# Patient Record
Sex: FEMALE | Race: WHITE | Employment: FULL TIME | URBAN - METROPOLITAN AREA
[De-identification: names, ages, dates, MRNs, and addresses within clinical notes are randomized per-mention and may not be internally consistent; named-entity substitution may affect disease eponyms.]

---

## 2018-10-26 ENCOUNTER — HOSPITAL ENCOUNTER (EMERGENCY)
Age: 43
Discharge: HOME | End: 2018-10-26
Attending: EMERGENCY MEDICINE
Payer: MEDICARE

## 2018-10-26 ENCOUNTER — APPOINTMENT (OUTPATIENT)
Dept: GENERAL RADIOLOGY | Age: 43
End: 2018-10-26
Payer: MEDICARE

## 2018-10-26 VITALS
RESPIRATION RATE: 18 BRPM | DIASTOLIC BLOOD PRESSURE: 106 MMHG | WEIGHT: 250 LBS | BODY MASS INDEX: 40.18 KG/M2 | TEMPERATURE: 97.8 F | OXYGEN SATURATION: 100 % | SYSTOLIC BLOOD PRESSURE: 189 MMHG | HEART RATE: 92 BPM | HEIGHT: 66 IN

## 2018-10-26 DIAGNOSIS — S63.501A RIGHT WRIST SPRAIN, INITIAL ENCOUNTER: Primary | ICD-10-CM

## 2018-10-26 PROCEDURE — 73110 X-RAY EXAM OF WRIST: CPT

## 2018-10-26 PROCEDURE — 99283 EMERGENCY DEPT VISIT LOW MDM: CPT

## 2018-10-26 ASSESSMENT — PAIN DESCRIPTION - LOCATION: LOCATION: WRIST

## 2018-10-26 ASSESSMENT — PAIN DESCRIPTION - PAIN TYPE: TYPE: ACUTE PAIN

## 2018-10-26 ASSESSMENT — PAIN DESCRIPTION - FREQUENCY: FREQUENCY: CONTINUOUS

## 2018-10-26 ASSESSMENT — PAIN DESCRIPTION - DESCRIPTORS: DESCRIPTORS: ACHING

## 2018-10-26 ASSESSMENT — PAIN SCALES - GENERAL: PAINLEVEL_OUTOF10: 7

## 2018-10-26 ASSESSMENT — PAIN DESCRIPTION - ORIENTATION: ORIENTATION: RIGHT

## 2018-10-26 NOTE — ED NOTES
Pt dcd home in stable condition along with her belongings and paperwork, pt verbalizes understanding of dc, follow up, and medication instructions. Denies any questions.      Carlos Phelps RN  10/26/18 5121

## 2020-08-25 ENCOUNTER — APPOINTMENT (OUTPATIENT)
Dept: GENERAL RADIOLOGY | Age: 45
End: 2020-08-25
Payer: MEDICARE

## 2020-08-25 ENCOUNTER — HOSPITAL ENCOUNTER (EMERGENCY)
Age: 45
Discharge: HOME | End: 2020-08-25
Attending: EMERGENCY MEDICINE
Payer: MEDICARE

## 2020-08-25 VITALS
HEART RATE: 76 BPM | OXYGEN SATURATION: 98 % | DIASTOLIC BLOOD PRESSURE: 97 MMHG | WEIGHT: 257 LBS | HEIGHT: 69 IN | BODY MASS INDEX: 38.06 KG/M2 | SYSTOLIC BLOOD PRESSURE: 141 MMHG | RESPIRATION RATE: 16 BRPM | TEMPERATURE: 98.1 F

## 2020-08-25 LAB
ALBUMIN SERPL-MCNC: 4.3 G/DL (ref 3.4–5)
ALP BLD-CCNC: 113 U/L (ref 40–129)
ALT SERPL-CCNC: 39 U/L (ref 10–40)
ANION GAP SERPL CALCULATED.3IONS-SCNC: 12 MMOL/L (ref 3–16)
AST SERPL-CCNC: 31 U/L (ref 15–37)
BASOPHILS ABSOLUTE: 0.1 K/UL (ref 0–0.2)
BASOPHILS RELATIVE PERCENT: 0.9 %
BILIRUB SERPL-MCNC: 0.7 MG/DL (ref 0–1)
BILIRUBIN DIRECT: <0.2 MG/DL (ref 0–0.3)
BILIRUBIN URINE: ABNORMAL
BILIRUBIN, INDIRECT: NORMAL MG/DL (ref 0–1)
BLOOD, URINE: ABNORMAL
BUN BLDV-MCNC: 15 MG/DL (ref 7–20)
CALCIUM SERPL-MCNC: 9.2 MG/DL (ref 8.3–10.6)
CHLORIDE BLD-SCNC: 104 MMOL/L (ref 99–110)
CLARITY: CLEAR
CO2: 23 MMOL/L (ref 21–32)
COLOR: ABNORMAL
COMMENT UA: ABNORMAL
CREAT SERPL-MCNC: 1 MG/DL (ref 0.6–1.1)
EKG ATRIAL RATE: 68 BPM
EKG DIAGNOSIS: NORMAL
EKG P AXIS: 25 DEGREES
EKG P-R INTERVAL: 182 MS
EKG Q-T INTERVAL: 404 MS
EKG QRS DURATION: 84 MS
EKG QTC CALCULATION (BAZETT): 429 MS
EKG R AXIS: 0 DEGREES
EKG T AXIS: 11 DEGREES
EKG VENTRICULAR RATE: 68 BPM
EOSINOPHILS ABSOLUTE: 0.3 K/UL (ref 0–0.6)
EOSINOPHILS RELATIVE PERCENT: 4.2 %
GFR AFRICAN AMERICAN: >60
GFR NON-AFRICAN AMERICAN: >60
GLUCOSE BLD-MCNC: 154 MG/DL (ref 70–99)
GLUCOSE BLD-MCNC: 161 MG/DL (ref 70–99)
GLUCOSE URINE: 100 MG/DL
HCT VFR BLD CALC: 39.3 % (ref 36–48)
HEMOGLOBIN: 13.5 G/DL (ref 12–16)
KETONES, URINE: NEGATIVE MG/DL
LEUKOCYTE ESTERASE, URINE: ABNORMAL
LIPASE: 43 U/L (ref 13–60)
LYMPHOCYTES ABSOLUTE: 2.3 K/UL (ref 1–5.1)
LYMPHOCYTES RELATIVE PERCENT: 33.5 %
MCH RBC QN AUTO: 29.3 PG (ref 26–34)
MCHC RBC AUTO-ENTMCNC: 34.4 G/DL (ref 31–36)
MCV RBC AUTO: 85.1 FL (ref 80–100)
MICROSCOPIC EXAMINATION: YES
MONOCYTES ABSOLUTE: 0.4 K/UL (ref 0–1.3)
MONOCYTES RELATIVE PERCENT: 6.2 %
NEUTROPHILS ABSOLUTE: 3.7 K/UL (ref 1.7–7.7)
NEUTROPHILS RELATIVE PERCENT: 55.2 %
NITRITE, URINE: POSITIVE
PDW BLD-RTO: 15.3 % (ref 12.4–15.4)
PERFORMED ON: ABNORMAL
PH UA: 6.5 (ref 5–8)
PLATELET # BLD: 183 K/UL (ref 135–450)
PMV BLD AUTO: 9.1 FL (ref 5–10.5)
POTASSIUM REFLEX MAGNESIUM: 3.6 MMOL/L (ref 3.5–5.1)
PROTEIN UA: 30 MG/DL
RBC # BLD: 4.62 M/UL (ref 4–5.2)
RBC UA: >100 /HPF (ref 0–4)
SODIUM BLD-SCNC: 139 MMOL/L (ref 136–145)
SPECIFIC GRAVITY UA: 1.02 (ref 1–1.03)
TOTAL PROTEIN: 7 G/DL (ref 6.4–8.2)
URINE TYPE: ABNORMAL
UROBILINOGEN, URINE: 1 E.U./DL
WBC # BLD: 6.7 K/UL (ref 4–11)
WBC UA: ABNORMAL /HPF (ref 0–5)

## 2020-08-25 PROCEDURE — 87186 SC STD MICRODIL/AGAR DIL: CPT

## 2020-08-25 PROCEDURE — 85025 COMPLETE CBC W/AUTO DIFF WBC: CPT

## 2020-08-25 PROCEDURE — 87088 URINE BACTERIA CULTURE: CPT

## 2020-08-25 PROCEDURE — 93005 ELECTROCARDIOGRAM TRACING: CPT | Performed by: STUDENT IN AN ORGANIZED HEALTH CARE EDUCATION/TRAINING PROGRAM

## 2020-08-25 PROCEDURE — 80048 BASIC METABOLIC PNL TOTAL CA: CPT

## 2020-08-25 PROCEDURE — 99284 EMERGENCY DEPT VISIT MOD MDM: CPT

## 2020-08-25 PROCEDURE — 80076 HEPATIC FUNCTION PANEL: CPT

## 2020-08-25 PROCEDURE — 81001 URINALYSIS AUTO W/SCOPE: CPT

## 2020-08-25 PROCEDURE — 83690 ASSAY OF LIPASE: CPT

## 2020-08-25 PROCEDURE — 87086 URINE CULTURE/COLONY COUNT: CPT

## 2020-08-25 RX ORDER — CEPHALEXIN 500 MG/1
500 CAPSULE ORAL 4 TIMES DAILY
Qty: 40 CAPSULE | Refills: 0 | Status: SHIPPED | OUTPATIENT
Start: 2020-08-25 | End: 2020-09-04

## 2020-08-25 RX ORDER — CEPHALEXIN 500 MG/1
500 CAPSULE ORAL EVERY 6 HOURS SCHEDULED
Status: DISCONTINUED | OUTPATIENT
Start: 2020-08-25 | End: 2020-08-25

## 2020-08-25 ASSESSMENT — PAIN DESCRIPTION - ORIENTATION: ORIENTATION: LEFT;LOWER

## 2020-08-25 ASSESSMENT — PAIN DESCRIPTION - PAIN TYPE: TYPE: ACUTE PAIN

## 2020-08-25 ASSESSMENT — PAIN DESCRIPTION - LOCATION: LOCATION: ABDOMEN

## 2020-08-25 ASSESSMENT — PAIN SCALES - GENERAL: PAINLEVEL_OUTOF10: 7

## 2020-08-25 ASSESSMENT — PAIN DESCRIPTION - DESCRIPTORS: DESCRIPTORS: DISCOMFORT;SHARP

## 2020-08-25 ASSESSMENT — PAIN DESCRIPTION - FREQUENCY: FREQUENCY: INTERMITTENT

## 2020-08-25 NOTE — ED PROVIDER NOTES
ED Attending Attestation Note     Date of evaluation: 8/25/2020    This patient was seen by the resident. I have seen and examined the patient, agree with the workup, evaluation, management and diagnosis. The care plan has been discussed. I have reviewed the ECG and concur. My assessment reveals patient with syncopal episode followed by hematuria. Flank pain here but not a story c/w stone but more c/w infection. Will start abx for pyelo.      Lisa Ordaz MD  08/25/20 [id number]

## 2020-08-25 NOTE — ED PROVIDER NOTES
[address] [facility] RESIDENT NOTE       Date of evaluation: 8/25/2020    Chief Complaint     Abdominal Pain (lower abd pain, hematuria x 3-4 days. Pt states she woke up on the living room floor and thought she passed out from low blood sugar. ) and Hematuria      of Present Illness     Luis Greer is a 38 y.o. female with past medical history of polycystic kidney disease who presents with hematuria. She states yesterday she noticed pink-tinged urine and today she had gross bright red urine in the toilet bowl. She has never had this happen before. She denies any dysuria, urinary frequency, urinary urgency. States that she has some suprapubic pain as well that is worse with urination. She also had an episode of syncope today when she was standing there in her living room she noticed whitening of her vision and then passed out. She states she was able to ambulate afterwards and denies any headache or other symptoms at this time. Other than stated above, no additional aggravating or alleviating factors are noted. Review of Systems     Positive for hematuria, abdominal pain, syncope. Negative for headache, dizziness, numbness, lightheadedness, shortness of breath, chest pain, nausea, vomiting, diarrhea. All other systems reviewed and are negative except as mentioned in HPI. Past Medical, Surgical, Family, and Social History     She has a past medical history of Footdrop, Hashimoto's disease, Hypertension, and Polycystic kidney disease. She has a past surgical history that includes back surgery. Her family history is not on file. She reports that she has never smoked. She has never used smokeless tobacco. She reports that she does not drink alcohol or use drugs.     Medications     Previous Medications    LISINOPRIL-HYDROCHLOROTHIAZIDE (PRINZIDE;ZESTORETIC) 20-25 MG PER TABLET    Take 1 tablet by mouth daily    MELOXICAM (MOBIC) 15 MG TABLET    *CALLED WC, WAITING FOR CALL BACK* TAKE 1 TABLET BY MOUTH EVERY DAY    METFORMIN (GLUCOPHAGE) 500 MG TABLET    Take 1,000 mg by mouth 2 times daily (with meals)    PROPRANOLOL (INDERAL) 80 MG TABLET    Take 80 mg by mouth 3 times daily    SEMAGLUTIDE (OZEMPIC, 1 MG/DOSE, SC)    Inject into the skin once a week TUESDAYS       Allergies     She is allergic to sulfa antibiotics. Physical Exam     INITIAL VITALS: BP: (!) 156/108, Temp: 98.1 °F (36.7 °C), Pulse: 71, Resp: 18, SpO2: 98 %   General:  Well appearing. No acute distress, appears mildly dehydrated  Eyes:  PERRL. No discharge from eyes   ENT:  No discharge from nose. OP clear  Neck:  Supple, trachea midline  Pulmonary:   Non-labored breathing. Breath sounds clear bilaterally  Cardiac:  Regular rate and rhythm. No murmurs  Abdomen:  Soft. Non-distended. Suprapubic pain, R CVA tenderness  Musculoskeletal:  No long bone deformity. Vascular:  Extremities warm and perfused. Normal pulses in all 4 extremities  Skin:  Dry, no rashes  Extremities:  No peripheral edema  Neuro: Alert. Moves all four extremities to command. Sensation grossly intact to light touch. Speech and mentation normal. No focal deficit. Gait narrow and stable.      Diagnostic Results     EKG   Interpreted in conjunction with emergencydepartment physician Tom Gamboa MD  Rhythm: normal sinus   Rate: normal  Axis: normal  Ectopy: none  Conduction: normal  ST Segments: no acute change  T Waves:no acute change  Q Waves: none  Clinical Impression: no acute changes    RADIOLOGY:  No orders to display       LABS:   Results for orders placed or performed during the hospital encounter of 08/25/20   CBC Auto Differential   Result Value Ref Range    WBC 6.7 4.0 - 11.0 K/uL    RBC 4.62 4.00 - 5.20 M/uL    Hemoglobin 13.5 12.0 - 16.0 g/dL    Hematocrit 39.3 36.0 - 48.0 %    MCV 85.1 80.0 - 100.0 fL    MCH 29.3 26.0 - 34.0 pg    MCHC 34.4 31.0 - 36.0 g/dL    RDW 15.3 12.4 - 15.4 %    Platelets 607 552 - 680 K/uL    MPV 9.1 5.0 - 10.5 fL    Neutrophils % 55.2 %    Lymphocytes % 33.5 %    Monocytes % 6.2 %    Eosinophils % 4.2 %    Basophils % 0.9 %    Neutrophils Absolute 3.7 1.7 - 7.7 K/uL    Lymphocytes Absolute 2.3 1.0 - 5.1 K/uL    Monocytes Absolute 0.4 0.0 - 1.3 K/uL    Eosinophils Absolute 0.3 0.0 - 0.6 K/uL    Basophils Absolute 0.1 0.0 - 0.2 K/uL   Basic Metabolic Panel w/ Reflex to MG   Result Value Ref Range    Sodium 139 136 - 145 mmol/L    Potassium reflex Magnesium 3.6 3.5 - 5.1 mmol/L    Chloride 104 99 - 110 mmol/L    CO2 23 21 - 32 mmol/L    Anion Gap 12 3 - 16    Glucose 154 (H) 70 - 99 mg/dL    BUN 15 7 - 20 mg/dL    CREATININE 1.0 0.6 - 1.1 mg/dL    GFR Non-African American >60 >60    GFR African American >60 >60    Calcium 9.2 8.3 - 10.6 mg/dL   Urinalysis, reflex to microscopic   Result Value Ref Range    Color, UA ORANGE (A) Straw/Yellow    Clarity, UA Clear Clear    Glucose, Ur 100 (A) Negative mg/dL    Bilirubin Urine SMALL (A) Negative    Ketones, Urine Negative Negative mg/dL    Specific Gravity, UA 1.025 1.005 - 1.030    Blood, Urine LARGE (A) Negative    pH, UA 6.5 5.0 - 8.0    Protein, UA 30 (A) Negative mg/dL    Urobilinogen, Urine 1.0 <2.0 E.U./dL    Nitrite, Urine POSITIVE (A) Negative    Leukocyte Esterase, Urine MODERATE (A) Negative    Microscopic Examination YES     Urine Type Voided    Hepatic Function Panel   Result Value Ref Range    Total Protein 7.0 6.4 - 8.2 g/dL    Alb 4.3 3.4 - 5.0 g/dL    Alkaline Phosphatase 113 40 - 129 U/L    ALT 39 10 - 40 U/L    AST 31 15 - 37 U/L    Total Bilirubin 0.7 0.0 - 1.0 mg/dL    Bilirubin, Direct <0.2 0.0 - 0.3 mg/dL    Bilirubin, Indirect see below 0.0 - 1.0 mg/dL   Lipase   Result Value Ref Range    Lipase 43.0 13.0 - 60.0 U/L   Microscopic Urinalysis   Result Value Ref Range    WBC, UA 21-50 (A) 0 - 5 /HPF    RBC, UA >100 (A) 0 - 4 /HPF    Urinalysis Comments see below    POCT Glucose   Result Value Ref Range    POC Glucose 161 (H) 70 - 99 mg/dl    Performed on ACCU-CHEK    EKG 12 Lead   Result Value Ref Range    Ventricular Rate 68 BPM    Atrial Rate 68 BPM    P-R Interval 182 ms    QRS Duration 84 ms    Q-T Interval 404 ms    QTc Calculation (Bazett) 429 ms    P Axis 25 degrees    R Axis 0 degrees    T Axis 11 degrees    Diagnosis       EKG performed in ER and to be interpreted by ER physician. Confirmed by MD, ER ([id number]),  Meg Ahumada ([phone]) on 8/25/2020 5:40:38 PM       ED BEDSIDE ULTRASOUND:      RECENT VITALS:  BP: (!) 141/97, Temp: 98.1 °F (36.7 °C), Pulse: 76,Resp: 16, SpO2: 98 %     Procedures         ED Course     Nursing Notes, Past Medical Hx, Past Surgical Hx, Social Hx, Allergies, and Family Hx were reviewed. The patient was given the followingmedications:  Orders Placed This Encounter   Medications    DISCONTD: cephALEXin (KEFLEX) capsule 500 mg    cephALEXin (KEFLEX) 500 MG capsule     Sig: Take 1 capsule by mouth 4 times daily for 10 days     Dispense:  40 capsule     Refill:  0       CONSULTS:  None    MEDICAL DECISION MAKING / ASSESSMENT / Tanya Scotty is a 38 y.o. female with a history and presentation as described above in HPI. The patient was evaluated by myself and the ED Attending Physician, Dr. Rama Portillo. All management and disposition plans were discussed and agreed upon. Upon presentation, the patient was well appearing and had stable vitals. Differential diagnosis include uncomplicated urinary tract infection, nephrolithiasis, pyelonephritis. Syncope differential includes orthostatic, hypovolemic, arrhythmia, ischemia. EKG normal, no evidence of ischemia. CBC with normal hemoglobin, LFTs, lipase unremarkable. Urinalysis with gross evidence of urinary tract infection including positive nitrites, moderate leukocyte esterase, hematuria. Points of care glucose 161. On reassessment, she is able to ambulate comfortably and has no recurrence of syncopal symptoms.   Suspect that this syncopal episode was secondary to pain or orthostasis, as she appears moderately dehydrated. Given she has right CVA tenderness and is more predisposed to pyelonephritis from her polycystic kidney disease, we will treat her for pyelonephritis with extended antibiotics outpatient. Instructed the patient to follow-up closely with her nephrologist if the hematuria does not resolve with treatment of the infection and return to the emergency department with new or worsening symptoms including repeat syncope, worsening of her abdominal pain or flank pain. Discharge: At this time, the patient was deemed appropriate for discharge. Workup, treatment and diagnosis were discussed with the patient and/or family members; the patient agrees to the plan and all questions were addressed and answered. My customary discharge instructions, including strict return precautions for new or worsening symptoms or any concern she believes warrants acute physician evaluation, were provided. she was subsequently sent home in stable/improved condition    Medications received during this ED visit:    Medications - No data to display    Clinical Impression     1. Acute pyelonephritis        Disposition     PATIENT REFERRED TO:  No follow-up provider specified.     DISCHARGE MEDICATIONS:  New Prescriptions    CEPHALEXIN (KEFLEX) 500 MG CAPSULE    Take 1 capsule by mouth 4 times daily for 10 days       DISPOSITION Decision To Discharge 08/25/2020 05:51:11 Mellisa Woods MD  Resident  08/25/20 9461

## 2020-08-27 LAB
ORGANISM: ABNORMAL
URINE CULTURE, ROUTINE: ABNORMAL

## 2021-08-02 ENCOUNTER — APPOINTMENT (OUTPATIENT)
Dept: GENERAL RADIOLOGY | Age: 46
End: 2021-08-02
Payer: COMMERCIAL

## 2021-08-02 ENCOUNTER — HOSPITAL ENCOUNTER (EMERGENCY)
Age: 46
Discharge: HOME | End: 2021-08-02
Attending: STUDENT IN AN ORGANIZED HEALTH CARE EDUCATION/TRAINING PROGRAM
Payer: COMMERCIAL

## 2021-08-02 VITALS
TEMPERATURE: 98.1 F | HEART RATE: 76 BPM | OXYGEN SATURATION: 96 % | DIASTOLIC BLOOD PRESSURE: 95 MMHG | SYSTOLIC BLOOD PRESSURE: 143 MMHG | RESPIRATION RATE: 18 BRPM

## 2021-08-02 DIAGNOSIS — R05.9 COUGH: Primary | ICD-10-CM

## 2021-08-02 LAB
ANION GAP SERPL CALCULATED.3IONS-SCNC: 10 MMOL/L (ref 3–16)
BASOPHILS ABSOLUTE: 0 K/UL (ref 0–0.2)
BASOPHILS RELATIVE PERCENT: 0.7 %
BUN BLDV-MCNC: 9 MG/DL (ref 7–20)
CALCIUM SERPL-MCNC: 9.2 MG/DL (ref 8.3–10.6)
CHLORIDE BLD-SCNC: 104 MMOL/L (ref 99–110)
CO2: 25 MMOL/L (ref 21–32)
CREAT SERPL-MCNC: 0.8 MG/DL (ref 0.6–1.1)
EKG ATRIAL RATE: 71 BPM
EKG DIAGNOSIS: NORMAL
EKG P AXIS: 27 DEGREES
EKG P-R INTERVAL: 176 MS
EKG Q-T INTERVAL: 384 MS
EKG QRS DURATION: 78 MS
EKG QTC CALCULATION (BAZETT): 417 MS
EKG R AXIS: 0 DEGREES
EKG T AXIS: 20 DEGREES
EKG VENTRICULAR RATE: 71 BPM
EOSINOPHILS ABSOLUTE: 0.2 K/UL (ref 0–0.6)
EOSINOPHILS RELATIVE PERCENT: 4.3 %
GFR AFRICAN AMERICAN: >60
GFR NON-AFRICAN AMERICAN: >60
GLUCOSE BLD-MCNC: 206 MG/DL (ref 70–99)
HCG QUALITATIVE: NEGATIVE
HCT VFR BLD CALC: 40.6 % (ref 36–48)
HEMOGLOBIN: 13.7 G/DL (ref 12–16)
LYMPHOCYTES ABSOLUTE: 1.3 K/UL (ref 1–5.1)
LYMPHOCYTES RELATIVE PERCENT: 28.4 %
MCH RBC QN AUTO: 28.9 PG (ref 26–34)
MCHC RBC AUTO-ENTMCNC: 33.8 G/DL (ref 31–36)
MCV RBC AUTO: 85.6 FL (ref 80–100)
MONOCYTES ABSOLUTE: 0.5 K/UL (ref 0–1.3)
MONOCYTES RELATIVE PERCENT: 10.7 %
NEUTROPHILS ABSOLUTE: 2.6 K/UL (ref 1.7–7.7)
NEUTROPHILS RELATIVE PERCENT: 55.9 %
PDW BLD-RTO: 15.4 % (ref 12.4–15.4)
PLATELET # BLD: 155 K/UL (ref 135–450)
PMV BLD AUTO: 9.1 FL (ref 5–10.5)
POTASSIUM REFLEX MAGNESIUM: 4 MMOL/L (ref 3.5–5.1)
PRO-BNP: 86 PG/ML (ref 0–124)
RBC # BLD: 4.74 M/UL (ref 4–5.2)
SODIUM BLD-SCNC: 139 MMOL/L (ref 136–145)
TROPONIN: <0.01 NG/ML
WBC # BLD: 4.6 K/UL (ref 4–11)

## 2021-08-02 PROCEDURE — U0003 INFECTIOUS AGENT DETECTION BY NUCLEIC ACID (DNA OR RNA); SEVERE ACUTE RESPIRATORY SYNDROME CORONAVIRUS 2 (SARS-COV-2) (CORONAVIRUS DISEASE [COVID-19]), AMPLIFIED PROBE TECHNIQUE, MAKING USE OF HIGH THROUGHPUT TECHNOLOGIES AS DESCRIBED BY CMS-2020-01-R: HCPCS

## 2021-08-02 PROCEDURE — 6370000000 HC RX 637 (ALT 250 FOR IP): Performed by: STUDENT IN AN ORGANIZED HEALTH CARE EDUCATION/TRAINING PROGRAM

## 2021-08-02 PROCEDURE — U0005 INFEC AGEN DETEC AMPLI PROBE: HCPCS

## 2021-08-02 PROCEDURE — 93005 ELECTROCARDIOGRAM TRACING: CPT | Performed by: STUDENT IN AN ORGANIZED HEALTH CARE EDUCATION/TRAINING PROGRAM

## 2021-08-02 PROCEDURE — 99284 EMERGENCY DEPT VISIT MOD MDM: CPT

## 2021-08-02 PROCEDURE — 83880 ASSAY OF NATRIURETIC PEPTIDE: CPT

## 2021-08-02 PROCEDURE — 84484 ASSAY OF TROPONIN QUANT: CPT

## 2021-08-02 PROCEDURE — 84703 CHORIONIC GONADOTROPIN ASSAY: CPT

## 2021-08-02 PROCEDURE — 85025 COMPLETE CBC W/AUTO DIFF WBC: CPT

## 2021-08-02 PROCEDURE — 71045 X-RAY EXAM CHEST 1 VIEW: CPT

## 2021-08-02 PROCEDURE — 80048 BASIC METABOLIC PNL TOTAL CA: CPT

## 2021-08-02 RX ORDER — ACETAMINOPHEN 325 MG/1
650 TABLET ORAL ONCE
Status: COMPLETED | OUTPATIENT
Start: 2021-08-02 | End: 2021-08-02

## 2021-08-02 RX ADMIN — ACETAMINOPHEN 650 MG: 325 TABLET ORAL at 12:28

## 2021-08-02 ASSESSMENT — PAIN DESCRIPTION - LOCATION: LOCATION: CHEST

## 2021-08-02 ASSESSMENT — ENCOUNTER SYMPTOMS
COUGH: 1
ABDOMINAL PAIN: 0
DIARRHEA: 0
SHORTNESS OF BREATH: 1
NAUSEA: 0
RHINORRHEA: 1
CONSTIPATION: 0
VOMITING: 0

## 2021-08-02 ASSESSMENT — PAIN DESCRIPTION - DESCRIPTORS: DESCRIPTORS: ACHING;BURNING

## 2021-08-02 ASSESSMENT — PAIN SCALES - GENERAL: PAINLEVEL_OUTOF10: 8

## 2021-08-02 ASSESSMENT — PAIN DESCRIPTION - PAIN TYPE: TYPE: ACUTE PAIN

## 2021-08-02 NOTE — ED TRIAGE NOTES
Patient complains of shortness of breath with cough since last evening. Hx of Covid in January. Patient reports that she did not receive covid vaccination. Rapid Covid test at home this morning was negative.

## 2021-08-02 NOTE — ED PROVIDER NOTES
ED Attending Attestation Note     Date of evaluation: 8/2/2021    This patient was seen by the resident. I have seen and examined the patient, agree with the workup, evaluation, management and diagnosis. The care plan has been discussed. I have reviewed the ECG and concur with the resident's interpretation. She denies hx of PE/DVT or use of estrogen. My assessment reveals a WDWN female in NAD speaking in complete sentences with no tachypnea. She is intermittently coughing; there are no rales or rhonci on auscultation and the lung sounds are CTAB with good air entry heard bilaterally.       Bala Farmer MD  08/02/21 [id number]

## 2021-08-02 NOTE — ED NOTES
Patient discharged to home via family. Written discharge instructions reviewed with understanding. Copy of AVS sent home with patient. Patient able to walk from ED without assistance.        Matthew Patel RN  08/02/21 4322

## 2021-08-02 NOTE — ED PROVIDER NOTES
[address] [facility] RESIDENT NOTE       Date of evaluation: 8/2/2021    Chief Complaint     Shortness of Breath (Since last evening) and Cough      History of Present Illness     Nickolas Lopez is a 41 y.o. female with a history of HTN and PCKD who presents to the emergency department for evaluation of cough and shortness of breath. Patient states that her symptoms started 3 days ago and have been gradually worsening since onset. She reports nonproductive cough. Associated shortness of breath which is exertional.  She also notes chest pain. Pain is located in the right side of her chest and is nonradiating. She has difficulty describing the pain but rates it an 8/10 in severity. No obvious aggravating alleviating factors of chest pain. Patient also reports viral URI symptoms including rhinorrhea, congestion, and mild headache. Patient has a history of pneumonia in the past and states that her symptoms today feel similar. Patient is not immunized against Covid. She did have COVID-19 earlier this year and states the symptoms do not feel similar. No known sick contacts. No fevers or chills. No abdominal pain, nausea, vomiting. No urinary symptoms. No lower extremity swelling or tenderness. No history of DVT or PE. No recent travel, prolonged immobilization, or surgeries. Review of Systems     Review of Systems   Constitutional: Negative for chills and fever. HENT: Positive for congestion and rhinorrhea. Respiratory: Positive for cough and shortness of breath. Cardiovascular: Positive for chest pain. Negative for leg swelling. Gastrointestinal: Negative for abdominal pain, constipation, diarrhea, nausea and vomiting. Genitourinary: Negative for dysuria and hematuria. Musculoskeletal: Negative for arthralgias. Skin: Negative for rash. Neurological: Negative for syncope, light-headedness and headaches.    Psychiatric/Behavioral: Negative no distension. Palpations: Abdomen is soft. Musculoskeletal:         General: Normal range of motion. Cervical back: Normal range of motion and neck supple. Skin:     General: Skin is warm and dry. Capillary Refill: Capillary refill takes less than 2 seconds. Findings: No rash. Neurological:      General: No focal deficit present. Mental Status: She is alert and oriented to person, place, and time. Psychiatric:         Mood and Affect: Mood normal.         Behavior: Behavior normal.         DiagnosticResults     EKG   Interpreted in conjunction with emergencydepartment physician No att. providers found  Rhythm: normal sinus   Rate: normal  Axis: normal  Ectopy: none  Conduction: normal  ST Segments: no acute change  T Waves:no acute change  Q Waves: none  Clinical Impression: Normal sinus rhythm, no ST or T wave changes    RADIOLOGY:  XR CHEST PORTABLE   Final Result   Impression:      No acute cardiopulmonary abnormality.           LABS:   Results for orders placed or performed during the hospital encounter of 08/02/21   CBC Auto Differential   Result Value Ref Range    WBC 4.6 4.0 - 11.0 K/uL    RBC 4.74 4.00 - 5.20 M/uL    Hemoglobin 13.7 12.0 - 16.0 g/dL    Hematocrit 40.6 36.0 - 48.0 %    MCV 85.6 80.0 - 100.0 fL    MCH 28.9 26.0 - 34.0 pg    MCHC 33.8 31.0 - 36.0 g/dL    RDW 15.4 12.4 - 15.4 %    Platelets 618 766 - 643 K/uL    MPV 9.1 5.0 - 10.5 fL    Neutrophils % 55.9 %    Lymphocytes % 28.4 %    Monocytes % 10.7 %    Eosinophils % 4.3 %    Basophils % 0.7 %    Neutrophils Absolute 2.6 1.7 - 7.7 K/uL    Lymphocytes Absolute 1.3 1.0 - 5.1 K/uL    Monocytes Absolute 0.5 0.0 - 1.3 K/uL    Eosinophils Absolute 0.2 0.0 - 0.6 K/uL    Basophils Absolute 0.0 0.0 - 0.2 K/uL   Basic Metabolic Panel w/ Reflex to MG   Result Value Ref Range    Sodium 139 136 - 145 mmol/L    Potassium reflex Magnesium 4.0 3.5 - 5.1 mmol/L    Chloride 104 99 - 110 mmol/L    CO2 25 21 - 32 mmol/L    Anion Gap 10 3 - 16    Glucose 206 (H) 70 - 99 mg/dL    BUN 9 7 - 20 mg/dL    CREATININE 0.8 0.6 - 1.1 mg/dL    GFR Non-African American >60 >60    GFR African American >60 >60    Calcium 9.2 8.3 - 10.6 mg/dL   Troponin   Result Value Ref Range    Troponin <0.01 <0.01 ng/mL   Brain Natriuretic Peptide   Result Value Ref Range    Pro-BNP 86 0 - 124 pg/mL   HCG Qualitative, Serum   Result Value Ref Range    hCG Qual Negative Detects HCG level >10 MIU/mL   EKG 12 Lead   Result Value Ref Range    Ventricular Rate 71 BPM    Atrial Rate 71 BPM    P-R Interval 176 ms    QRS Duration 78 ms    Q-T Interval 384 ms    QTc Calculation (Bazett) 417 ms    P Axis 27 degrees    R Axis 0 degrees    T Axis 20 degrees    Diagnosis       EKG performed in ER and to be interpreted by ER physician. Confirmed by MD, ER ([id number]),  Sidney Jocelin ([phone]) on 8/2/2021 12:24:57 PM       ED BEDSIDE ULTRASOUND:  None. RECENT VITALS:  BP: (!) 143/95, Temp: 98.1 °F (36.7 °C), Pulse: 76,Resp: 18, SpO2: 96 %     Procedures     None. ED Course     Nursing Notes, Past Medical Hx, Past Surgical Hx, Social Hx, Allergies, and Family Hx were reviewed. The patient was given the followingmedications:  Orders Placed This Encounter   Medications    DISCONTD: ibuprofen (ADVIL;MOTRIN) tablet 600 mg    acetaminophen (TYLENOL) tablet 650 mg       CONSULTS:  None    MEDICAL DECISION MAKING / ASSESSMENT / Shadiamarge Mercado is a 41 y.o. female who presents to the emergency department for cough, shortness of breath, and viral URI symptoms. Patient also reports right-sided chest pain. Patient has a history of similar symptoms at which time she was diagnosed with pneumonia. Vital signs are notable for hypertension but otherwise are within normal limits. Physical exam reveals a well-appearing female in no acute distress with an occasional dry cough throughout the history and physical exam.  No other focal abnormal physical exam findings.   History and physical exam are most consistent with viral URI. Also considered Covid infection, bacterial pneumonia, ACS, pneumothorax. Also considered PE, however given normal vital signs and lack of risk factors no further work-up of this is indicated at this time. Extensive work-up was obtained. CBC is within normal limits. BMP is unremarkable without electrolyte or other abnormalities. Troponin is negative. EKG obtained and shows normal sinus rhythm without ST or T wave changes. BNP negative. Chest x-ray also obtained did not show signs of consolidation or other concerning abnormalities. Patient remained stable condition throughout her time the emergency department. Her symptoms were treated with ibuprofen with moderate improvement. Patient with HEART score of 2. Plan is made to discharge the patient home with outpatient follow-up with primary care provider. Given concern for possible Covid infection, we discussed social distancing as well as wearing mask. I encouraged the patient to receive her vaccine although she declines it at this time. Strict return instructions were given. Patient ultimately discharged home in stable condition. This patient was also evaluated by the attending physician. All care plans werediscussed and agreed upon. Clinical Impression     1. Cough        Disposition     PATIENT REFERRED TO:  No follow-up provider specified.     DISCHARGE MEDICATIONS:  New Prescriptions    No medications on file       DISPOSITION Decision To Discharge 08/02/2021 01:46:19 PM     Fabrice Blunt MD  08/02/21 1348       Fabrice Blunt MD  08/02/21 3689

## 2021-08-03 LAB — SARS-COV-2: NOT DETECTED

## 2022-11-16 ENCOUNTER — APPOINTMENT (OUTPATIENT)
Dept: GENERAL RADIOLOGY | Age: 47
End: 2022-11-16
Payer: COMMERCIAL

## 2022-11-16 ENCOUNTER — HOSPITAL ENCOUNTER (OUTPATIENT)
Age: 47
Setting detail: OBSERVATION
Discharge: LEFT BEFORE DISCHARGE | End: 2022-11-17
Attending: EMERGENCY MEDICINE | Admitting: STUDENT IN AN ORGANIZED HEALTH CARE EDUCATION/TRAINING PROGRAM
Payer: COMMERCIAL

## 2022-11-16 ENCOUNTER — APPOINTMENT (OUTPATIENT)
Dept: CT IMAGING | Age: 47
End: 2022-11-16
Payer: COMMERCIAL

## 2022-11-16 DIAGNOSIS — R07.9 CHEST PAIN, UNSPECIFIED TYPE: Primary | ICD-10-CM

## 2022-11-16 DIAGNOSIS — E87.6 HYPOKALEMIA: ICD-10-CM

## 2022-11-16 DIAGNOSIS — E83.42 HYPOMAGNESEMIA: ICD-10-CM

## 2022-11-16 DIAGNOSIS — E80.6 HYPERBILIRUBINEMIA: ICD-10-CM

## 2022-11-16 LAB
A/G RATIO: 1.4 (ref 1.1–2.2)
ALBUMIN SERPL-MCNC: 4.2 G/DL (ref 3.4–5)
ALP BLD-CCNC: 98 U/L (ref 40–129)
ALT SERPL-CCNC: 9 U/L (ref 10–40)
ANION GAP SERPL CALCULATED.3IONS-SCNC: 14 MMOL/L (ref 3–16)
AST SERPL-CCNC: 15 U/L (ref 15–37)
BASOPHILS ABSOLUTE: 0 K/UL (ref 0–0.2)
BASOPHILS RELATIVE PERCENT: 0.2 %
BILIRUB SERPL-MCNC: 2.4 MG/DL (ref 0–1)
BUN BLDV-MCNC: 13 MG/DL (ref 7–20)
CALCIUM SERPL-MCNC: 9.4 MG/DL (ref 8.3–10.6)
CHLORIDE BLD-SCNC: 102 MMOL/L (ref 99–110)
CO2: 23 MMOL/L (ref 21–32)
CREAT SERPL-MCNC: 1 MG/DL (ref 0.6–1.1)
D DIMER: 1.9 UG/ML FEU (ref 0–0.6)
EOSINOPHILS ABSOLUTE: 0 K/UL (ref 0–0.6)
EOSINOPHILS RELATIVE PERCENT: 0.1 %
GFR SERPL CREATININE-BSD FRML MDRD: >60 ML/MIN/{1.73_M2}
GLUCOSE BLD-MCNC: 130 MG/DL (ref 70–99)
GLUCOSE BLD-MCNC: 132 MG/DL (ref 70–99)
HCT VFR BLD CALC: 37.7 % (ref 36–48)
HEMOGLOBIN: 12.7 G/DL (ref 12–16)
LIPASE: 17 U/L (ref 13–60)
LYMPHOCYTES ABSOLUTE: 0.2 K/UL (ref 1–5.1)
LYMPHOCYTES RELATIVE PERCENT: 2.9 %
MAGNESIUM: 1.6 MG/DL (ref 1.8–2.4)
MCH RBC QN AUTO: 29.9 PG (ref 26–34)
MCHC RBC AUTO-ENTMCNC: 33.8 G/DL (ref 31–36)
MCV RBC AUTO: 88.3 FL (ref 80–100)
MONOCYTES ABSOLUTE: 0.4 K/UL (ref 0–1.3)
MONOCYTES RELATIVE PERCENT: 4.5 %
NEUTROPHILS ABSOLUTE: 7.6 K/UL (ref 1.7–7.7)
NEUTROPHILS RELATIVE PERCENT: 92.3 %
PDW BLD-RTO: 14.2 % (ref 12.4–15.4)
PERFORMED ON: ABNORMAL
PLATELET # BLD: 94 K/UL (ref 135–450)
PLATELET SLIDE REVIEW: ADEQUATE
PMV BLD AUTO: 9.1 FL (ref 5–10.5)
POTASSIUM REFLEX MAGNESIUM: 3 MMOL/L (ref 3.5–5.1)
PRO-BNP: 428 PG/ML (ref 0–124)
RBC # BLD: 4.27 M/UL (ref 4–5.2)
SLIDE REVIEW: ABNORMAL
SODIUM BLD-SCNC: 139 MMOL/L (ref 136–145)
TOTAL PROTEIN: 7.1 G/DL (ref 6.4–8.2)
TROPONIN: <0.01 NG/ML
WBC # BLD: 8.2 K/UL (ref 4–11)

## 2022-11-16 PROCEDURE — 80053 COMPREHEN METABOLIC PANEL: CPT

## 2022-11-16 PROCEDURE — 85025 COMPLETE CBC W/AUTO DIFF WBC: CPT

## 2022-11-16 PROCEDURE — 71046 X-RAY EXAM CHEST 2 VIEWS: CPT

## 2022-11-16 PROCEDURE — 85379 FIBRIN DEGRADATION QUANT: CPT

## 2022-11-16 PROCEDURE — 99285 EMERGENCY DEPT VISIT HI MDM: CPT

## 2022-11-16 PROCEDURE — 71260 CT THORAX DX C+: CPT | Performed by: EMERGENCY MEDICINE

## 2022-11-16 PROCEDURE — 84484 ASSAY OF TROPONIN QUANT: CPT

## 2022-11-16 PROCEDURE — 6360000004 HC RX CONTRAST MEDICATION: Performed by: EMERGENCY MEDICINE

## 2022-11-16 PROCEDURE — 83880 ASSAY OF NATRIURETIC PEPTIDE: CPT

## 2022-11-16 PROCEDURE — G0378 HOSPITAL OBSERVATION PER HR: HCPCS

## 2022-11-16 PROCEDURE — 83690 ASSAY OF LIPASE: CPT

## 2022-11-16 PROCEDURE — 74177 CT ABD & PELVIS W/CONTRAST: CPT

## 2022-11-16 PROCEDURE — 6370000000 HC RX 637 (ALT 250 FOR IP): Performed by: EMERGENCY MEDICINE

## 2022-11-16 PROCEDURE — 96365 THER/PROPH/DIAG IV INF INIT: CPT

## 2022-11-16 PROCEDURE — 83735 ASSAY OF MAGNESIUM: CPT

## 2022-11-16 PROCEDURE — 96366 THER/PROPH/DIAG IV INF ADDON: CPT

## 2022-11-16 PROCEDURE — 93005 ELECTROCARDIOGRAM TRACING: CPT | Performed by: EMERGENCY MEDICINE

## 2022-11-16 PROCEDURE — 6360000002 HC RX W HCPCS: Performed by: EMERGENCY MEDICINE

## 2022-11-16 RX ORDER — LEVOTHYROXINE SODIUM 300 UG/1
300 TABLET ORAL DAILY
COMMUNITY
Start: 2021-11-11

## 2022-11-16 RX ORDER — ONDANSETRON 2 MG/ML
4 INJECTION INTRAMUSCULAR; INTRAVENOUS EVERY 6 HOURS PRN
Status: DISCONTINUED | OUTPATIENT
Start: 2022-11-16 | End: 2022-11-17

## 2022-11-16 RX ORDER — PROPRANOLOL HYDROCHLORIDE 40 MG/1
80 TABLET ORAL 3 TIMES DAILY
Status: DISCONTINUED | OUTPATIENT
Start: 2022-11-17 | End: 2022-11-17

## 2022-11-16 RX ORDER — ATORVASTATIN CALCIUM 40 MG/1
40 TABLET, FILM COATED ORAL NIGHTLY
Status: DISCONTINUED | OUTPATIENT
Start: 2022-11-16 | End: 2022-11-16

## 2022-11-16 RX ORDER — LEVOTHYROXINE SODIUM 0.1 MG/1
300 TABLET ORAL DAILY
Status: DISCONTINUED | OUTPATIENT
Start: 2022-11-17 | End: 2022-11-17

## 2022-11-16 RX ORDER — ASPIRIN 81 MG/1
324 TABLET, CHEWABLE ORAL ONCE
Status: COMPLETED | OUTPATIENT
Start: 2022-11-16 | End: 2022-11-16

## 2022-11-16 RX ORDER — ATORVASTATIN CALCIUM 20 MG/1
20 TABLET, FILM COATED ORAL NIGHTLY
Status: DISCONTINUED | OUTPATIENT
Start: 2022-11-17 | End: 2022-11-17

## 2022-11-16 RX ORDER — LISINOPRIL AND HYDROCHLOROTHIAZIDE 12.5; 1 MG/1; MG/1
1 TABLET ORAL DAILY
Status: DISCONTINUED | OUTPATIENT
Start: 2022-11-17 | End: 2022-11-17

## 2022-11-16 RX ORDER — PANTOPRAZOLE SODIUM 40 MG/1
40 TABLET, DELAYED RELEASE ORAL
Status: DISCONTINUED | OUTPATIENT
Start: 2022-11-17 | End: 2022-11-17

## 2022-11-16 RX ORDER — SODIUM CHLORIDE 9 MG/ML
INJECTION, SOLUTION INTRAVENOUS PRN
Status: DISCONTINUED | OUTPATIENT
Start: 2022-11-16 | End: 2022-11-17

## 2022-11-16 RX ORDER — INSULIN LISPRO 100 [IU]/ML
0-8 INJECTION, SOLUTION INTRAVENOUS; SUBCUTANEOUS
Status: DISCONTINUED | OUTPATIENT
Start: 2022-11-17 | End: 2022-11-17

## 2022-11-16 RX ORDER — SODIUM CHLORIDE 0.9 % (FLUSH) 0.9 %
5-40 SYRINGE (ML) INJECTION EVERY 12 HOURS SCHEDULED
Status: DISCONTINUED | OUTPATIENT
Start: 2022-11-16 | End: 2022-11-17

## 2022-11-16 RX ORDER — INSULIN GLARGINE 100 [IU]/ML
6 INJECTION, SOLUTION SUBCUTANEOUS EVERY MORNING
Status: DISCONTINUED | OUTPATIENT
Start: 2022-11-17 | End: 2022-11-17

## 2022-11-16 RX ORDER — INSULIN GLARGINE 100 [IU]/ML
6 INJECTION, SOLUTION SUBCUTANEOUS EVERY MORNING
COMMUNITY

## 2022-11-16 RX ORDER — INSULIN LISPRO 100 [IU]/ML
0-4 INJECTION, SOLUTION INTRAVENOUS; SUBCUTANEOUS NIGHTLY
Status: DISCONTINUED | OUTPATIENT
Start: 2022-11-16 | End: 2022-11-17

## 2022-11-16 RX ORDER — ATORVASTATIN CALCIUM 20 MG/1
1 TABLET, FILM COATED ORAL DAILY
COMMUNITY
Start: 2021-07-23

## 2022-11-16 RX ORDER — DEXTROSE MONOHYDRATE 100 MG/ML
INJECTION, SOLUTION INTRAVENOUS CONTINUOUS PRN
Status: DISCONTINUED | OUTPATIENT
Start: 2022-11-16 | End: 2022-11-17

## 2022-11-16 RX ORDER — POTASSIUM CHLORIDE 20 MEQ/1
40 TABLET, EXTENDED RELEASE ORAL ONCE
Status: COMPLETED | OUTPATIENT
Start: 2022-11-16 | End: 2022-11-16

## 2022-11-16 RX ORDER — POLYETHYLENE GLYCOL 3350 17 G/17G
17 POWDER, FOR SOLUTION ORAL DAILY PRN
Status: DISCONTINUED | OUTPATIENT
Start: 2022-11-16 | End: 2022-11-17

## 2022-11-16 RX ORDER — ASPIRIN 81 MG/1
81 TABLET, CHEWABLE ORAL DAILY
Status: DISCONTINUED | OUTPATIENT
Start: 2022-11-17 | End: 2022-11-17

## 2022-11-16 RX ORDER — ACETAMINOPHEN 325 MG/1
650 TABLET ORAL EVERY 6 HOURS PRN
Status: DISCONTINUED | OUTPATIENT
Start: 2022-11-16 | End: 2022-11-17

## 2022-11-16 RX ORDER — SODIUM CHLORIDE 0.9 % (FLUSH) 0.9 %
5-40 SYRINGE (ML) INJECTION PRN
Status: DISCONTINUED | OUTPATIENT
Start: 2022-11-16 | End: 2022-11-17

## 2022-11-16 RX ORDER — NYSTATIN 100000 [USP'U]/G
POWDER TOPICAL
COMMUNITY
Start: 2022-09-30

## 2022-11-16 RX ORDER — ACETAMINOPHEN 650 MG/1
650 SUPPOSITORY RECTAL EVERY 6 HOURS PRN
Status: DISCONTINUED | OUTPATIENT
Start: 2022-11-16 | End: 2022-11-17

## 2022-11-16 RX ORDER — LISINOPRIL AND HYDROCHLOROTHIAZIDE 12.5; 1 MG/1; MG/1
1 TABLET ORAL DAILY
COMMUNITY

## 2022-11-16 RX ORDER — LISINOPRIL AND HYDROCHLOROTHIAZIDE 25; 20 MG/1; MG/1
1 TABLET ORAL DAILY
Status: CANCELLED | OUTPATIENT
Start: 2022-11-16

## 2022-11-16 RX ORDER — OMEPRAZOLE 20 MG/1
20 CAPSULE, DELAYED RELEASE ORAL DAILY
COMMUNITY
Start: 2021-01-20

## 2022-11-16 RX ORDER — ONDANSETRON 4 MG/1
4 TABLET, ORALLY DISINTEGRATING ORAL EVERY 8 HOURS PRN
Status: DISCONTINUED | OUTPATIENT
Start: 2022-11-16 | End: 2022-11-17

## 2022-11-16 RX ORDER — MAGNESIUM SULFATE IN WATER 40 MG/ML
2000 INJECTION, SOLUTION INTRAVENOUS ONCE
Status: COMPLETED | OUTPATIENT
Start: 2022-11-16 | End: 2022-11-16

## 2022-11-16 RX ORDER — ENOXAPARIN SODIUM 100 MG/ML
40 INJECTION SUBCUTANEOUS DAILY
Status: DISCONTINUED | OUTPATIENT
Start: 2022-11-17 | End: 2022-11-17

## 2022-11-16 RX ADMIN — POTASSIUM CHLORIDE 40 MEQ: 1500 TABLET, EXTENDED RELEASE ORAL at 20:56

## 2022-11-16 RX ADMIN — ASPIRIN 81 MG 324 MG: 81 TABLET ORAL at 17:01

## 2022-11-16 RX ADMIN — IOPAMIDOL 75 ML: 755 INJECTION, SOLUTION INTRAVENOUS at 18:11

## 2022-11-16 RX ADMIN — MAGNESIUM SULFATE HEPTAHYDRATE 2000 MG: 40 INJECTION, SOLUTION INTRAVENOUS at 19:33

## 2022-11-16 ASSESSMENT — PAIN DESCRIPTION - LOCATION: LOCATION: CHEST

## 2022-11-16 ASSESSMENT — PAIN DESCRIPTION - PAIN TYPE: TYPE: ACUTE PAIN

## 2022-11-16 ASSESSMENT — PAIN - FUNCTIONAL ASSESSMENT: PAIN_FUNCTIONAL_ASSESSMENT: 0-10

## 2022-11-16 ASSESSMENT — PAIN DESCRIPTION - FREQUENCY: FREQUENCY: CONTINUOUS

## 2022-11-16 NOTE — ED TRIAGE NOTES
Pt arrives ambulatory for eval of chest pain onset this, body aches, pt denies n/v. Pt sts gatric sleeve in June. . Pt a/ox4 resp nonlabored and pwd

## 2022-11-16 NOTE — ED PROVIDER NOTES
[address] COMPLAINT  Chest Pain (Pt arrives ambulatory for eval of chest pain onset this, body aches, pt denies n/v)       HISTORY OF PRESENT ILLNESS  Meghan Floyd is a 56 y.o. female who presents to the ED with complaint of \"my chest feels like it's being pulled apart\". Symptoms started today. Chills started this afternoon. Reportedly passed out while she was on the phone with her friend. Underwent gastric sleeve in  and lost \"a lot of weight\". The chest pain is midsternal and radiating to the left shoulder and elbow, pressure, 10/10 intensity, no exacerbating or ameliorating factors, never had pain like this before, associated with SOB. Denies nausea, vomiting, diarrhea, abdominal pain, dysuria. Denies tobacco. Mother  46 of MI. BMI 38. History of HTN, HLD. Denies cough, rhinorrhea. No other complaints, modifying factors or associated symptoms. I have reviewed the following from the nursing documentation:    Past Medical History:   Diagnosis Date    Footdrop     Hashimoto's disease     Hypertension     Polycystic kidney disease      Past Surgical History:   Procedure Laterality Date    BACK SURGERY      x 5 surgies, last in     GASTRIC BAND      HYSTERECTOMY, TOTAL ABDOMINAL (CERVIX REMOVED)       History reviewed. No pertinent family history.   Social History     Socioeconomic History    Marital status: Single     Spouse name: Not on file    Number of children: Not on file    Years of education: Not on file    Highest education level: Not on file   Occupational History    Not on file   Tobacco Use    Smoking status: Never    Smokeless tobacco: Never   Vaping Use    Vaping Use: Never used   Substance and Sexual Activity    Alcohol use: No    Drug use: No    Sexual activity: Yes     Partners: Male   Other Topics Concern    Not on file   Social History Narrative    Not on file     Social Determinants of Health     Financial Resource Strain: Not on file   Food Insecurity: Not on file   Transportation Needs: Not on file   Physical Activity: Not on file   Stress: Not on file   Social Connections: Not on file   Intimate Partner Violence: Not on file   Housing Stability: Not on file     Current Facility-Administered Medications   Medication Dose Route Frequency Provider Last Rate Last Admin    magnesium sulfate 2000 mg in 50 mL IVPB premix  2,000 mg IntraVENous Once Jessica MD Ricardo 25 mL/hr at 11/16/22 1933 2,000 mg at 11/16/22 1933     Current Outpatient Medications   Medication Sig Dispense Refill    atorvastatin (LIPITOR) 20 MG tablet Take 1 tablet by mouth daily      empagliflozin (JARDIANCE) 10 MG tablet Take 10 mg by mouth daily      levothyroxine (SYNTHROID) 300 MCG tablet Take 300 mcg by mouth daily      omeprazole (PRILOSEC) 20 MG delayed release capsule Take 20 mg by mouth daily      insulin glargine (BASAGLAR KWIKPEN) 100 UNIT/ML injection pen Inject 6 Units into the skin every morning      lisinopril-hydroCHLOROthiazide (PRINZIDE;ZESTORETIC) 10-12.5 MG per tablet Take 1 tablet by mouth daily      nystatin (MYCOSTATIN) 508400 UNIT/GM powder APPLY TO AFFECTED AREA TWICE A DAY      metFORMIN (GLUCOPHAGE) 500 MG tablet Take 1,000 mg by mouth daily (with breakfast)      Semaglutide (OZEMPIC, 1 MG/DOSE, SC) Inject into the skin once a week TUESDAYS      meloxicam (MOBIC) 15 MG tablet *CALLED WC, WAITING FOR CALL BACK* TAKE 1 TABLET BY MOUTH EVERY DAY  0    propranolol (INDERAL) 80 MG tablet Take 80 mg by mouth 3 times daily       Allergies   Allergen Reactions    Sulfa Antibiotics Hives       REVIEW OF SYSTEMS  10 systems reviewed, pertinent positives and negatives per HPI, otherwise noted to be negative.     PHYSICAL EXAM  ED Triage Vitals [11/16/22 1634]   Enc Vitals Group      /73      Heart Rate 96      Resp 16      Temp 97.8 °F (36.6 °C)      Temp Source Oral      SpO2 97 %      Weight 187 lb 9.8 oz (85.1 kg)      Height 5' 9\" (1.753 m) Head Circumference       Peak Flow       Pain Score       Pain Loc       Pain Edu? Excl. in [address]? General appearance: Awake and alert. Cooperative. No acute distress. HENT: Normocephalic. Atraumatic. Mucous membranes are moist.  Neck: Supple. Eyes: PERRL. EOMI. Heart/Chest: RRR. No murmurs. Lungs: Respirations unlabored. CTAB. Good air exchange. Speaking comfortably in full sentences. Abdomen: Soft. Non-tender. Non-distended. No rebound or guarding. Musculoskeletal: No extremity edema. No deformity. No tenderness in the extremities. All extremities neurovascularly intact. Skin: Warm and dry. No acute rashes. Neurological: Alert and oriented. CN II-XII intact. Strength 5/5 bilateral upper and lower extremities. Sensation intact to light touch. Gait normal.  Psychiatric: Mood/affect: normal      LABS  I have reviewed all labs for this visit.    Results for orders placed or performed during the hospital encounter of 11/16/22   CBC with Auto Differential   Result Value Ref Range    WBC 8.2 4.0 - 11.0 K/uL    RBC 4.27 4.00 - 5.20 M/uL    Hemoglobin 12.7 12.0 - 16.0 g/dL    Hematocrit 37.7 36.0 - 48.0 %    MCV 88.3 80.0 - 100.0 fL    MCH 29.9 26.0 - 34.0 pg    MCHC 33.8 31.0 - 36.0 g/dL    RDW 14.2 12.4 - 15.4 %    Platelets 94 (L) 837 - 450 K/uL    MPV 9.1 5.0 - 10.5 fL    PLATELET SLIDE REVIEW Adequate     SLIDE REVIEW see below     Neutrophils % 92.3 %    Lymphocytes % 2.9 %    Monocytes % 4.5 %    Eosinophils % 0.1 %    Basophils % 0.2 %    Neutrophils Absolute 7.6 1.7 - 7.7 K/uL    Lymphocytes Absolute 0.2 (L) 1.0 - 5.1 K/uL    Monocytes Absolute 0.4 0.0 - 1.3 K/uL    Eosinophils Absolute 0.0 0.0 - 0.6 K/uL    Basophils Absolute 0.0 0.0 - 0.2 K/uL   Comprehensive Metabolic Panel w/ Reflex to MG   Result Value Ref Range    Sodium 139 136 - 145 mmol/L    Potassium reflex Magnesium 3.0 (L) 3.5 - 5.1 mmol/L    Chloride 102 99 - 110 mmol/L    CO2 23 21 - 32 mmol/L    Anion Gap 14 3 - 16 Glucose 130 (H) 70 - 99 mg/dL    BUN 13 7 - 20 mg/dL    Creatinine 1.0 0.6 - 1.1 mg/dL    Est, Glom Filt Rate >60 >60    Calcium 9.4 8.3 - 10.6 mg/dL    Total Protein 7.1 6.4 - 8.2 g/dL    Albumin 4.2 3.4 - 5.0 g/dL    Albumin/Globulin Ratio 1.4 1.1 - 2.2    Total Bilirubin 2.4 (H) 0.0 - 1.0 mg/dL    Alkaline Phosphatase 98 40 - 129 U/L    ALT 9 (L) 10 - 40 U/L    AST 15 15 - 37 U/L   Troponin   Result Value Ref Range    Troponin <0.01 <0.01 ng/mL   Brain Natriuretic Peptide   Result Value Ref Range    Pro- (H) 0 - 124 pg/mL   D-Dimer, Quantitative   Result Value Ref Range    D-Dimer, Quant 1.90 (H) 0.00 - 0.60 ug/mL FEU   Magnesium   Result Value Ref Range    Magnesium 1.60 (L) 1.80 - 2.40 mg/dL   Lipase   Result Value Ref Range    Lipase 17.0 13.0 - 60.0 U/L   POCT Glucose   Result Value Ref Range    POC Glucose 132 (H) 70 - 99 mg/dl    Performed on [email or web address]        RADIOLOGY  I have reviewed all radiographic studies for this visit. CT CHEST PULMONARY EMBOLISM W CONTRAST   Final Result   No evidence of pulmonary embolism or acute pulmonary abnormality. RECOMMENDATIONS:   Unavailable         CT ABDOMEN PELVIS W IV CONTRAST Additional Contrast? None   Final Result   1. No acute findings. 2. Prior gastric sleeve procedure with a small hiatal hernia. 3. Hepatic steatosis. 4. Numerous bilateral renal cysts the largest on the right measuring 8.9 cm. XR CHEST (2 VW)   Final Result   No radiographic evidence of an acute cardiopulmonary process. ECG  EKG interpreted by myself. Rate: normal  Rhythm: NSR  Axis: normal  Intervals: within normal limits  ST Segments: no acute abnormality  T waves: no acute abnormality  Comparison: Compared to 8/2/21, there is no significant change  Impression: NSR with no acute abnormality     ED COURSE/MDM  Patient seen and evaluated. Old records reviewed. Labs and imaging reviewed and results discussed with patient/family to extent possible. This is a 52-year-old female presenting with complaint of chest pain. EKG without acute ischemic abnormality. Troponin within normal limits. D-dimer is elevated however CTPA shows no pulmonary embolism or other acute pulmonary abnormality. Patient is found to be hypomagnesemic and hypokalemic. Both repleted. Mild hyperbilirubinemia at 2.4. CT abdomen and pelvis shows patient's status post gastric sleeve procedure, hepatic steatosis, no acute findings. Heart score calculates to moderate risk therefore admit to hospital medicine. Patient given full dose aspirin. Case discussed with hospitalist.    Is this patient to be included in the SEP-1 Core Measure? No   Exclusion criteria - the patient is NOT to be included for SEP-1 Core Measure due to: Infection is not suspected    I, Israel Cheadle, MD, am the primary clinician of record. During the patient's ED course, the patient was given:  Medications   magnesium sulfate 2000 mg in 50 mL IVPB premix (2,000 mg IntraVENous New Bag 11/16/22 1933)   aspirin chewable tablet 324 mg (324 mg Oral Given 11/16/22 1701)   iopamidol (ISOVUE-370) 76 % injection 75 mL (75 mLs IntraVENous Given 11/16/22 1811)   potassium chloride (KLOR-CON M) extended release tablet 40 mEq (40 mEq Oral Given 11/16/22 2056)        All questions were answered and the patient/family expressed understanding and agreement with the plan. PROCEDURES  None    CRITICAL CARE  N/A    CLINICAL IMPRESSION  1. Chest pain, unspecified type    2. Hyperbilirubinemia    3. Hypokalemia    4. Hypomagnesemia        DISPOSITION   admit    Israel Cheadle, MD    Note: This chart was created using voice recognition dictation software. Efforts were made by me to ensure accuracy, however some errors may be present due to limitations of this technology and occasionally words are not transcribed correctly.         Israel Cheadle, MD  11/16/22 2111

## 2022-11-17 VITALS
DIASTOLIC BLOOD PRESSURE: 87 MMHG | WEIGHT: 187.61 LBS | TEMPERATURE: 97.8 F | OXYGEN SATURATION: 97 % | HEART RATE: 92 BPM | SYSTOLIC BLOOD PRESSURE: 108 MMHG | RESPIRATION RATE: 20 BRPM | BODY MASS INDEX: 27.79 KG/M2 | HEIGHT: 69 IN

## 2022-11-17 LAB
ALBUMIN SERPL-MCNC: 3.6 G/DL (ref 3.4–5)
ALP BLD-CCNC: 80 U/L (ref 40–129)
ALT SERPL-CCNC: 8 U/L (ref 10–40)
ANION GAP SERPL CALCULATED.3IONS-SCNC: 12 MMOL/L (ref 3–16)
ANION GAP SERPL CALCULATED.3IONS-SCNC: 13 MMOL/L (ref 3–16)
AST SERPL-CCNC: 14 U/L (ref 15–37)
BACTERIA: ABNORMAL /HPF
BILIRUB SERPL-MCNC: 1.9 MG/DL (ref 0–1)
BILIRUBIN DIRECT: 0.6 MG/DL (ref 0–0.3)
BILIRUBIN URINE: NEGATIVE
BILIRUBIN, INDIRECT: 1.3 MG/DL (ref 0–1)
BLOOD, URINE: ABNORMAL
BUDDING YEAST: PRESENT
BUN BLDV-MCNC: 17 MG/DL (ref 7–20)
BUN BLDV-MCNC: 17 MG/DL (ref 7–20)
CALCIUM SERPL-MCNC: 8.7 MG/DL (ref 8.3–10.6)
CALCIUM SERPL-MCNC: 8.9 MG/DL (ref 8.3–10.6)
CHLORIDE BLD-SCNC: 100 MMOL/L (ref 99–110)
CHLORIDE BLD-SCNC: 102 MMOL/L (ref 99–110)
CHOLESTEROL, TOTAL: 162 MG/DL (ref 0–199)
CLARITY: ABNORMAL
CO2: 22 MMOL/L (ref 21–32)
CO2: 23 MMOL/L (ref 21–32)
COLOR: YELLOW
CREAT SERPL-MCNC: 1 MG/DL (ref 0.6–1.1)
CREAT SERPL-MCNC: 1 MG/DL (ref 0.6–1.1)
EKG ATRIAL RATE: 89 BPM
EKG ATRIAL RATE: 90 BPM
EKG DIAGNOSIS: NORMAL
EKG DIAGNOSIS: NORMAL
EKG P AXIS: 32 DEGREES
EKG P AXIS: 51 DEGREES
EKG P-R INTERVAL: 170 MS
EKG P-R INTERVAL: 182 MS
EKG Q-T INTERVAL: 356 MS
EKG Q-T INTERVAL: 360 MS
EKG QRS DURATION: 86 MS
EKG QRS DURATION: 88 MS
EKG QTC CALCULATION (BAZETT): 433 MS
EKG QTC CALCULATION (BAZETT): 440 MS
EKG R AXIS: 55 DEGREES
EKG R AXIS: 6 DEGREES
EKG T AXIS: 25 DEGREES
EKG T AXIS: 58 DEGREES
EKG VENTRICULAR RATE: 89 BPM
EKG VENTRICULAR RATE: 90 BPM
EPITHELIAL CELLS, UA: 12 /HPF (ref 0–5)
ESTIMATED AVERAGE GLUCOSE: 91.1 MG/DL
GFR SERPL CREATININE-BSD FRML MDRD: >60 ML/MIN/{1.73_M2}
GFR SERPL CREATININE-BSD FRML MDRD: >60 ML/MIN/{1.73_M2}
GLUCOSE BLD-MCNC: 103 MG/DL (ref 70–99)
GLUCOSE BLD-MCNC: 115 MG/DL (ref 70–99)
GLUCOSE BLD-MCNC: 119 MG/DL (ref 70–99)
GLUCOSE URINE: NEGATIVE MG/DL
HBA1C MFR BLD: 4.8 %
HCT VFR BLD CALC: 33.7 % (ref 36–48)
HDLC SERPL-MCNC: 43 MG/DL (ref 40–60)
HEMOGLOBIN: 11.3 G/DL (ref 12–16)
HYALINE CASTS: 1 /LPF (ref 0–8)
KETONES, URINE: 80 MG/DL
LDL CHOLESTEROL CALCULATED: 98 MG/DL
LEUKOCYTE ESTERASE, URINE: ABNORMAL
MAGNESIUM: 2.3 MG/DL (ref 1.8–2.4)
MCH RBC QN AUTO: 29.5 PG (ref 26–34)
MCHC RBC AUTO-ENTMCNC: 33.6 G/DL (ref 31–36)
MCV RBC AUTO: 87.6 FL (ref 80–100)
MICROSCOPIC EXAMINATION: YES
NITRITE, URINE: POSITIVE
PDW BLD-RTO: 14.4 % (ref 12.4–15.4)
PERFORMED ON: ABNORMAL
PH UA: 6 (ref 5–8)
PHOSPHORUS: 3.3 MG/DL (ref 2.5–4.9)
PLATELET # BLD: 86 K/UL (ref 135–450)
PMV BLD AUTO: 9.3 FL (ref 5–10.5)
POTASSIUM REFLEX MAGNESIUM: 3.4 MMOL/L (ref 3.5–5.1)
POTASSIUM SERPL-SCNC: 3.5 MMOL/L (ref 3.5–5.1)
PROTEIN UA: 100 MG/DL
RBC # BLD: 3.85 M/UL (ref 4–5.2)
RBC UA: 23 /HPF (ref 0–4)
SODIUM BLD-SCNC: 135 MMOL/L (ref 136–145)
SODIUM BLD-SCNC: 137 MMOL/L (ref 136–145)
SPECIFIC GRAVITY UA: 1.03 (ref 1–1.03)
TOTAL PROTEIN: 6.3 G/DL (ref 6.4–8.2)
TRIGL SERPL-MCNC: 103 MG/DL (ref 0–150)
TROPONIN: <0.01 NG/ML
URINE TYPE: ABNORMAL
UROBILINOGEN, URINE: 1 E.U./DL
VLDLC SERPL CALC-MCNC: 21 MG/DL
WBC # BLD: 6.7 K/UL (ref 4–11)
WBC UA: 58 /HPF (ref 0–5)

## 2022-11-17 PROCEDURE — 93005 ELECTROCARDIOGRAM TRACING: CPT | Performed by: NURSE PRACTITIONER

## 2022-11-17 PROCEDURE — 83735 ASSAY OF MAGNESIUM: CPT

## 2022-11-17 PROCEDURE — 84484 ASSAY OF TROPONIN QUANT: CPT

## 2022-11-17 PROCEDURE — 93010 ELECTROCARDIOGRAM REPORT: CPT | Performed by: INTERNAL MEDICINE

## 2022-11-17 PROCEDURE — 85027 COMPLETE CBC AUTOMATED: CPT

## 2022-11-17 PROCEDURE — 83036 HEMOGLOBIN GLYCOSYLATED A1C: CPT

## 2022-11-17 PROCEDURE — 80076 HEPATIC FUNCTION PANEL: CPT

## 2022-11-17 PROCEDURE — 80069 RENAL FUNCTION PANEL: CPT

## 2022-11-17 PROCEDURE — G0378 HOSPITAL OBSERVATION PER HR: HCPCS

## 2022-11-17 PROCEDURE — 81001 URINALYSIS AUTO W/SCOPE: CPT

## 2022-11-17 PROCEDURE — 80061 LIPID PANEL: CPT

## 2022-11-17 PROCEDURE — 6370000000 HC RX 637 (ALT 250 FOR IP): Performed by: NURSE PRACTITIONER

## 2022-11-17 RX ADMIN — PROPRANOLOL HYDROCHLORIDE 80 MG: 40 TABLET ORAL at 03:34

## 2022-11-17 RX ADMIN — ATORVASTATIN CALCIUM 20 MG: 20 TABLET, FILM COATED ORAL at 03:34

## 2022-11-17 NOTE — ED NOTES
Pt continues to request to sign out AMA. Pt informed she can return at any time should symptoms return/worsen. Pt verbalized understanding of AVS and states she will follow up outpatient for stress test. Pts family at bedside at this time. AMA signed. IV removed. VS as noted. Pt denies any additional questions/concerns at this time.       Jonathan Tucker RN  11/17/22 9814

## 2022-11-17 NOTE — PROGRESS NOTES
Medication Reconciliation    List of medications for James Aguilar is currently taking is complete. Source of Information:   Epic records  Conversation with patient at bedside    Notes Regarding Home Medications:   Patient did not receive any of their home medications prior to arrival to the emergency department  Per fill history pt is not consistent with filling medications. Pt states she takes her medications everyday.      Denies other otc/herbal use    Jose Luis Holman, Pharmacy Intern  11/16/2022 8:46 PM

## 2022-11-17 NOTE — ED NOTES
Perfect Serve from Dr Alessia Bhatti stating pt can sign out AMA if she would like. Does not need to see/speak with patient.       [id number] Jase Rousseau RN  11/17/22 4522

## 2022-11-17 NOTE — H&P
Hospital Medicine History & Physical      PCP: JOVANA SILVER [address]    Date of Admission: 2022    Date of Service: Pt seen/examined on 2022 and Placed in Observation. Chief Complaint:  chest pain      History Of Present Illness:      56 y.o. female with PMHx of DM, HTN, hashimoto's disease and HLD presented to [facility] with chest pain. Reports pain began this morning about 9:30. Describes pain as \"someone sitting on my chest\", worse with exertion. Radiating pain to left shoulder/arm. + diaphoresis and lightheaded. No nausea/vomiting. Significant family hx - mother had 1st MI at 24y/o,  at 46y/o, sisters have had early heart attacks. Past Medical History:          Diagnosis Date    Footdrop     Hashimoto's disease     Hypertension     Polycystic kidney disease        Past Surgical History:          Procedure Laterality Date    BACK SURGERY      x 5 surgies, last in     GASTRIC BAND      HYSTERECTOMY, TOTAL ABDOMINAL (CERVIX REMOVED)         Medications Prior to Admission:      Prior to Admission medications    Medication Sig Start Date End Date Taking?  Authorizing Provider   atorvastatin (LIPITOR) 20 MG tablet Take 1 tablet by mouth daily 21  Yes Historical Provider, MD   empagliflozin (JARDIANCE) 10 MG tablet Take 10 mg by mouth daily 21  Yes Historical Provider, MD   levothyroxine (SYNTHROID) 300 MCG tablet Take 300 mcg by mouth daily 21  Yes Historical Provider, MD   omeprazole (PRILOSEC) 20 MG delayed release capsule Take 20 mg by mouth daily 21  Yes Historical Provider, MD   insulin glargine (BASAGLAR KWIKPEN) 100 UNIT/ML injection pen Inject 6 Units into the skin every morning   Yes Historical Provider, MD   lisinopril-hydroCHLOROthiazide (PRINZIDE;ZESTORETIC) 10-12.5 MG per tablet Take 1 tablet by mouth daily    Historical Provider, MD   nystatin (MYCOSTATIN) 909479 UNIT/GM powder APPLY TO AFFECTED AREA TWICE A DAY 22   Historical Provider, MD   metFORMIN (GLUCOPHAGE) 500 MG tablet Take 1,000 mg by mouth daily (with breakfast)    Historical Provider, MD   Semaglutide (OZEMPIC, 1 MG/DOSE, SC) Inject into the skin once a week TUESDAYS    Historical Provider, MD   meloxicam (MOBIC) 15 MG tablet *CALLED WC, WAITING FOR CALL BACK* TAKE 1 TABLET BY MOUTH EVERY DAY 10/16/17   Historical Provider, MD   propranolol (INDERAL) 80 MG tablet Take 80 mg by mouth 3 times daily    Historical Provider, MD       Allergies:  Sulfa antibiotics    Social History:      The patient currently lives with children and spouse    TOBACCO:   reports that she has never smoked. She has never used smokeless tobacco.  ETOH:   reports no history of alcohol use. Family History:      Reviewed in detail positive as follows:    History reviewed. No pertinent family history. REVIEW OF SYSTEMS:   Pertinent positives as noted in the HPI. All other systems reviewed and negative. PHYSICAL EXAM PERFORMED:    BP (!) 141/93   Pulse 76   Temp 97.8 °F (36.6 °C) (Oral)   Resp 15   Ht 5' 9\" (1.753 m)   Wt 187 lb 9.8 oz (85.1 kg)   SpO2 100%   BMI 27.71 kg/m²     General appearance:  Well developed, well nourished,  female lying on ED cart in no apparent distress, appears stated age and cooperative. HEENT:  Normal cephalic, atraumatic without obvious deformity. Pupils equal, round, and reactive to light. Conjunctivae/corneas clear. Neck: Supple, with full range of motion. No jugular venous distention. Trachea midline. Respiratory:  Normal respiratory effort. Clear to auscultation, bilaterally without accessory muscle use. Cardiovascular:  Regular rate and rhythm without murmurs, no lower extremity edema. Abdomen: Soft, non-tender, non-distended, without rebound or guarding. Normal bowel sounds. Musculoskeletal:  Moves all extremities equally. Full range of motion without deformity. Skin: Skin warm, dry and intact. No rashes or lesions.   Neurologic: Neurovascularly intact without any focal sensory/motor deficits. Cranial nerves: II-XII intact, grossly non-focal.  Psychiatric:  Alert and oriented, thought content appropriate, normal insight  Capillary Refill: Brisk,< 3 seconds   Peripheral Pulses: +2 palpable, equal bilaterally       Labs:     Recent Labs     11/16/22  1654   WBC 8.2   HGB 12.7   HCT 37.7   PLT 94*     Recent Labs     11/16/22  1653      K 3.0*      CO2 23   BUN 13   CREATININE 1.0   CALCIUM 9.4     Recent Labs     11/16/22  1653   AST 15   ALT 9*   BILITOT 2.4*   ALKPHOS 98     No results for input(s): INR in the last 72 hours. Recent Labs     11/16/22  1653   TROPONINI <0.01       Urinalysis:      Lab Results   Component Value Date/Time    NITRU POSITIVE 08/25/2020 04:39 PM    WBCUA 21-50 08/25/2020 04:39 PM    RBCUA >100 08/25/2020 04:39 PM    BLOODU LARGE 08/25/2020 04:39 PM    SPECGRAV 1.025 08/25/2020 04:39 PM    GLUCOSEU 100 08/25/2020 04:39 PM       Radiology:     EKG:  I have reviewed the EKG with the following interpretation: normal sinus rhythm, no acute ST elevation, no change from previous 8/2021    CT CHEST PULMONARY EMBOLISM W CONTRAST   Final Result   No evidence of pulmonary embolism or acute pulmonary abnormality. RECOMMENDATIONS:   Unavailable         CT ABDOMEN PELVIS W IV CONTRAST Additional Contrast? None   Final Result   1. No acute findings. 2. Prior gastric sleeve procedure with a small hiatal hernia. 3. Hepatic steatosis. 4. Numerous bilateral renal cysts the largest on the right measuring 8.9 cm. XR CHEST (2 VW)   Final Result   No radiographic evidence of an acute cardiopulmonary process.              ASSESSMENT:    Active Hospital Problems    Diagnosis Date Noted    Chest pain [R07.9] 11/16/2022     Priority: Medium         PLAN:    Chest pain  - ECG shows no ST/T abnormalities  - troponins neg, will trend x 2 more draws  - ASA, statin  - NTG for chest pain PRN  - will obtain ECG if chest pain recurrs  - stress test in am  - check lipid panel  - consult cardiology    Diabetes mellitus II   - hold home po meds  - SSI and CCD    Essential (primary) hypertension   - monitor blood pressure  - continue home meds     Hyperlipidemia   - continue statin    DVT Prophylaxis: Lovenox  Diet: Diet NPO  Code Status: Full Code    Dispo - Observation       [address], APRN - CNP    Thank you JOVANA Mckoy for the opportunity to be involved in this patient's care. If you have any questions or concerns please feel free to contact me at [phone].

## 2022-11-17 NOTE — ED NOTES
This RN to room to give morning meds. Pt stating \"If my lab works comes back okay, I just want to go home. I don't need to stay. I can schedule an outpatient stress test\". Expresses frustration about having to stay in the ED due to no inpatient beds and not having answers. Explained that she would have to sign out AMA. States understanding. Perfect Serve sent to Dr Brent Rivero to make aware of situation. Refusing morning meds at this time until she knows plan of care.       Elo Call, DEANA  11/17/22 [address], RN  11/17/22 0296